# Patient Record
Sex: MALE | Race: WHITE | NOT HISPANIC OR LATINO
[De-identification: names, ages, dates, MRNs, and addresses within clinical notes are randomized per-mention and may not be internally consistent; named-entity substitution may affect disease eponyms.]

---
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
## 2021-05-27 NOTE — PROGRESS NOTES
[facility] Discharge Summary  Patient Name: Jhonny Judge  Date: 4/3/2019  Referral Diagnosis:   Shoulder pain [M25.519]  - Primary   Referring provider: Asad Asencio MD  Visit Diagnosis:   1. Impingement syndrome of right shoulder     2. Shoulder weakness     3. Poor posture          Goals: All Goals Met  Pt. will be independent with home exercise program in : 4 weeks  Pt. will report decreased intensity, frequency of : in 4 weeks;Comment  Comment:: 50%    Pt will: return to swimming without increased pain in 6 weeks:  Pt will: be able to reach overhead without increased pain in 4 weeks:      Patient was seen for 7 visits from 1/4/19 to 2/13/19 with - missed appointments.    The patient met goals and has demonstrated understanding of and independence in the home program for self-care, and progression to next steps.  He will initiate contact if questions or concerns arise.    Therapy will be discontinued at this time.  The patient will need a new referral to resume.    Thank you for your referral.  Chuy SMITH  4/3/2019  8:56 AM

## 2021-05-31 VITALS — HEIGHT: 73 IN | WEIGHT: 131 LBS | BODY MASS INDEX: 17.36 KG/M2

## 2021-05-31 VITALS — WEIGHT: 131 LBS | BODY MASS INDEX: 17.4 KG/M2

## 2021-06-14 NOTE — PROGRESS NOTES
"Assessment/ Plan     1. Sore throat/ RLQ abdominal pain  Patient's rapid strep is negative.  Backup culture will be sent and he will be notified if that is positive.  Likely viral process.  He was tender in the right lower quadrant so I ran a CBC which was normal.  Discussed with patient that if things get worse or spikes a fever, he should be reevaluated.  Would have him to stick to clear liquids tonight.  I did call and talk to his father on the phone as he came in by himself today.  I reviewed with him the assessment and plan today.  They will keep an eye on his abdominal pain.  - Rapid Strep A Screen-Throat  - Group A Strep, RNA Direct Detection, Throat  - HM2(CBC w/o Differential)      Subjective:       Jhonny Judge is a 17 y.o. male who presents for evaluation of sore throat.  Patient presents by himself today.  He states that he started to have a sore throat yesterday.  He is felt kind of achy in his stomach feels \"weird\".  His appetite has been down.  No specific exposures that he knows of.  He has felt hot and cold all day, but has not taken his temperature.  He said no vomiting or diarrhea.  One of his friends has been sick but not sure with what.  He is otherwise healthy without major medical problems.  He has had no abdominal surgeries.    Relevant past medical, family, surgical, and social history reviewed with patient, unless noted in HPI, not pertinent for this visit.    Review of Systems   A 12 point comprehensive review of systems was negative except as noted.      No current outpatient prescriptions on file.     No current facility-administered medications for this visit.        Objective:      /56  Pulse 92  Temp 98.2  F (36.8  C) (Oral)   Resp 18  Ht 6' 0.75\" (1.848 m)  Wt 131 lb (59.4 kg)  BMI 17.4 kg/m2      General appearance: alert, appears stated age and cooperative  Head: Normocephalic, without obvious abnormality, atraumatic  Eyes: conjunctivae/corneas clear. PERRL, EOM's " intact. Fundi benign.  Ears: normal TM's and external ear canals both ears  Nose: Nares normal. Septum midline. Mucosa normal. No drainage or sinus tenderness.  Throat: lips, mucosa, and tongue normal; teeth and gums normal, oropharynx is negative  Neck: no adenopathy  Lungs: clear to auscultation bilaterally  Heart: regular rate and rhythm, S1, S2 normal, no murmur, click, rub or gallop  Abdomen: soft,  bowel sounds normal; no masses,  no organomegaly, tender in the right lower quadrant      Recent Results (from the past 168 hour(s))   Rapid Strep A Screen-Throat   Result Value Ref Range    Rapid Strep A Antigen No Group A Strep detected, presumptive negative No Group A Strep detected, presumptive negative   HM2(CBC w/o Differential)   Result Value Ref Range    WBC 4.8 4.5 - 13.0 thou/uL    RBC 4.99 4.50 - 5.30 mill/uL    Hemoglobin 15.3 13.0 - 16.0 g/dL    Hematocrit 44.8 36.0 - 51.0 %    MCV 90 78 - 98 fL    MCH 30.6 25.0 - 35.0 pg    MCHC 34.1 32.0 - 36.0 g/dL    RDW 11.6 11.5 - 14.0 %    Platelets 208 140 - 440 thou/uL    MPV 8.5 7.0 - 10.0 fL          This note has been dictated using voice recognition software. Any grammatical or context distortions are unintentional and inherent to the software

## 2021-06-15 NOTE — PROGRESS NOTES
Name: Jhonny Judge  Age: 17 y.o.  Gender: male  : 2000  Date of Encounter: 2018      HPI:  Jhonny Judge is a 17 y.o.  male who presents to the [facility] by mom with concerns of sore throat.  Patient reports sore throat for the last 3 days and felt feverish today at school.  Did not go to school on Tuesday because earlier in the week he had nausea and emesis which has not recurred.  Last few days he has been eating and drinking normally.  He did not take his temperature today at school.  Denies headache, ear pain, wheezing, shortness of breath, nausea, vomiting, diarrhea and skin rash.  His sister comes home on the weekends and a couple weekends ago she was diagnosed with strep pharyngitis.  She was treated for strep but her sore throat persisted and this weekend she came back home and was treated diagnosed with mono.    Current Medication:   Medications reviewed and updated.  No current outpatient prescriptions on file.    Past Med / Surg History:  No past medical history on file.  No past surgical history on file.    Fam / Soc History:  Family History   Problem Relation Age of Onset     Depression Mother      Depression Father      Depression Maternal Grandmother      Social History     Social History     Marital status: Single     Spouse name: N/A     Number of children: N/A     Years of education: N/A     Occupational History     Not on file.     Social History Main Topics     Smoking status: Never Smoker     Smokeless tobacco: Never Used     Alcohol use Not on file     Drug use: Not on file     Sexual activity: Not on file     Other Topics Concern     Not on file     Social History Narrative       ROS:  14 point review of systems unremarkable except as mentioned in HPI    Objective:  Vitals: /64  Pulse 58  Temp 98.4  F (36.9  C) (Temporal)   Wt 131 lb (59.4 kg)  SpO2 98%    Gen: Alert, awake, well appearing  HEENT: NC, AT,   Ears:  Ear canals clear.  TMs normal  appearing.  Eyes:  EOMI.  Pupils equally round and reactive to light. Conjunctivae clear.  Sclera non-icteric.  Nose:  Nasal mucosa pink, septum midline.  No sinus tenderness  Mouth:  MMM. Oropharynx erythematous. No tonsillar exudate. Teeth, gum, tongue and lips clear.  Neck:  Supple, FROM, No lymphandenpathy, No TM  Heart: Regular rate and rhythm; normal S1 and S2; no murmurs, gallops, or rubs.  Peripheral Vessels: Normal pulses and perfusion.  Lungs: Unlabored respirations; symmetric chest expansion; clear breath sounds.  Extremities: No clubbing, cyanosis, or edema. Normal upper and lower extremities.  Skin: Normal turgor and without lesions or rashes.  Mental Status: Alert, oriented, in no distress. Mood and affect appropriate.l.     Pertinent results / imaging:  Results for orders placed or performed in visit on 01/12/18   Rapid Strep A Screen-Throat   Result Value Ref Range    Rapid Strep A Antigen No Group A Strep detected, presumptive negative No Group A Strep detected, presumptive negative       Assessment: Viral illness    Plan: Reviewed negative RST test with mom and patient.  Informed follow-up PCR strep test is pending and they will be notified of results only if positive.  If negative most probably viral illness.  Reviewed symptomatic cares, expected course, duration of symptoms and reasons to return for reevaluation.  Mom and patient voiced understanding and were in agreement with plan.      Magalis Last MD  1/12/2018

## 2021-06-17 NOTE — PATIENT INSTRUCTIONS - HE
Patient Instructions by Chuy Vallejo PT at 1/8/2019  4:00 PM     Author: Chuy Vallejo PT Service: -- Author Type: Physical Therapist    Filed: 1/8/2019  4:29 PM Encounter Date: 1/8/2019 Status: Signed    : Chuy Valleoj PT (Physical Therapist)        SERRATUS WALL SLIDE - ELASTIC BAND    Place an elastic band around your arms at the level of your wrists as shown. Next, place your forearms and hands along a wall so that your elbows are bent and your arms point towards the ceiling.     Then, protract (round) your shoulder blades forward and then slide your arms up the wall as shown.     Keep elbows tucked in.  Don't slide too high (FCI up)    Return to the original position and repeat.    5-10 repetitions  3 sets  1X/day

## 2021-06-17 NOTE — PATIENT INSTRUCTIONS - HE
Patient Instructions by Chuy Vallejo PT at 1/4/2019  7:30 AM     Author: Chuy Vallejo PT Service: -- Author Type: Physical Therapist    Filed: 1/4/2019  8:11 AM Encounter Date: 1/4/2019 Status: Signed    : Chuy Vallejo PT (Physical Therapist)             SIDELYING EXTERNAL ROTATION WITH TOWEL    Lie on your side with your elbow bent to 90 degrees. Place a rolled up towel between your arm and the side your body as shown.     Squeeze your shoulder blade back and down toward your buttocks and hold that position.     Next, roll your arm upwards from your stomach area towards the ceiling while maintaining your arm against the towel and with your shoulder blade held down and back the entire time. Lower your arm and repeat.     Hold 3 seconds.  10-20X  2 sets  1X/day      PRONE RETRACTION EXTENSION - PRONE I    Lying face down with your arms by your side, slowly move your arms upward towards the ceiling as you squeeze your shoulder blades downwards and towards your spine.    Hold 3-5 seconds  10-20X  2 sets  1X/day    PRONE T - BILATERAL - THUMBS UP    Lie face down with your elbow straight and arms out to the side. Next, set your scapula by retracting it towards your spine and downward towards your feet. Then, slowly raise your arms towards the ceiling keeping your elbow straight the entire time as shown.    Your thumbs should be pointed in the upward direction as your arm raises.    Hold 3-5 seconds  10-20X  2 sets  1X/day      ELASTIC BAND ROWS     Holding elastic band with both hands, draw back the band as you bend your elbows. Keep your elbows near the side of your body.    Hold 3-5 seconds  10-20X  2 sets  1X/day

## 2021-06-17 NOTE — PATIENT INSTRUCTIONS - HE
Patient Instructions by Chuy Vallejo PT at 1/11/2019  8:00 AM     Author: Chuy Vallejo PT Service: -- Author Type: Physical Therapist    Filed: 1/11/2019  8:27 AM Encounter Date: 1/11/2019 Status: Signed    : Chuy Vallejo, PT (Physical Therapist)        PRONE Y    Lying face down with your arms stretched out upwards as shown, slowly move your arms upward towards the ceiling as you squeeze your shoulder blades downward and towards your spine.    10-20X  1-2 sets  1X/day

## 2021-06-22 NOTE — PROGRESS NOTES
[facility]   Shoulder Initial Evaluation    Patient Name: Jhonny Judge  Date of evaluation: 1/4/2019  Referral Diagnosis: Shoulder pain [M25.519]  - Primary   Referring provider: Asad Asencio MD  Visit Diagnosis:     ICD-10-CM    1. Impingement syndrome of right shoulder M75.41    2. Shoulder weakness R29.898    3. Poor posture R29.3        Assessment:       Jhonny Judge is a 18 y.o. male who presents to therapy today with chief complaints of right shoulder pain which started in September, but has worsened in December 2018 after swimming season has begun. The patient is limited with swimming, lifting, reaching due to pain. With examination, the patient demonstrates s/s consistent with shoulder impingement due to hypermobility and weakness in RC and scapular stabilizers. The patient will likely benefit from skilled PT to improve his strength, mobility, pain, and function.     Pt. is appropriate for skilled PT intervention as outlined in the Plan of Care (POC).  Pt. is a good candidate for skilled PT services to improve pain levels and function.    POC and pathology of condition were reviewed with patient.  Pt. is in agreement with the Plan of Care  A Home Exercise Program (HEP) was initiated today.  Pt. was instructed in exercises by PT and patient was given a handout with detailed instructions.    Goals:  Pt. will be independent with home exercise program in : 4 weeks  Pt. will report decreased intensity, frequency of : in 4 weeks;Comment  Comment:: 50%    Pt will: return to swimming without increased pain in 6 weeks:  Pt will: be able to reach overhead without increased pain in 4 weeks:      Patient's expectations/goals are realistic.    Barriers to Learning or Achieving Goals:  No Barriers.       Plan / Patient Instructions:        Plan of Care:   Communication with: Referral Source  Patient Related Instruction: Nature of Condition;Posture;Precautions;Treatment plan and rationale;Next  steps;Expected outcome;Self Care instruction;Basis of treatment;Body mechanics  Times per Week: 1-2  Number of Weeks: 6  Number of Visits: 6  Discharge Planning: pt will meet all PT goals or reach a plateau with PT  Precautions / Restrictions : none  Therapeutic Exercise: ROM;Stretching;Strengthening  Neuromuscular Reeducation: kinesio tape;posture;TNE;core  Manual Therapy: soft tissue mobilization;myofascial release;joint mobilization;muscle energy  Modalities: TENS;electrical stimulation;other  Modalities: LIMITED PRN      Plan for next visit: Review HEP, trial serratus strengthening, banded ER/IR, pec stretch, KT to shoulder possibly  .   Subjective:       Social information:   Occupation:Student- /swimmer   Work Status:NA    History of Present Illness:      Pain Started September 2018, decreased, but returned in December 2018. Start of swim season in thanksgiving started getting more soreness in the right shoulder. Improved for a bit,but then return over jake break. Can keep him up at night now. Has been doing just kicking currently with swim practice. Had a cortisone injection on Wednesday this seek. Pain a little sharp with movement. Doing some icing.  Pain Described as comes in waves, dull and aching most of the time, at certain points can get sharper pains.  Worse with lifting, swimming, reaching up, opening doors, sleeping on his  Better with ibuprofen.  Previous Treatment injection, ibuprofen.  History of no other history.    Pain Rating:3  Pain rating at best: 2  Pain rating at worst: 8  Pain description: aching, dull, pain and sharp    Functional limitations are described as occurring with:          Objective:      Note: Items left blank indicates the item was not performed or not indicated at the time of the evaluation.    Patient Outcome Measures :    Shoulder Pain and Disability Index (SPADI) in %: 37     Scores range from 0-100%, where a score of 0% represents minimal pain and maximal  function. The minimal clincically important difference is a score reduction of 10%.    Shoulder Examination  1. Impingement syndrome of right shoulder     2. Shoulder weakness     3. Poor posture       Precautions/Restrictions: None  Involved side: Right  Posture Observation:       Patient demonstrates significant kyphotic posture at rest- able to have neutral spine.  Scapulae wing at rest.    Cervical Clearing: Normal    Shoulder/Elbow ROM    Date: 1/4/18     Shoulder and Elbow ROM ( )   AROM in degrees AROM in degrees AROM in degrees    Right Left Right Left Right Left   Shoulder Flexion (0-180 ) 140 deg 140 deg       Shoulder Abduction (0-180 ) 150 deg 170 deg       Shoulder Extension (0-60 )         Shoulder ER (0-90 ) 120 110       Shoulder IR (0-70 ) 90 (35 pure GH motion) 80       Shoulder IR/Ext T4  T7 T4  T3       Elbow Flexion (150 )         Elbow Extension (0 )          PROM in degrees PROM in degrees PROM in degrees    Right Left Right Left Right Left   Shoulder Flexion (0-180 )         Shoulder Abduction (0-180 )         Shoulder Extension (0-60 )         Shoulder ER (0-90 )         Shoulder IR (0-70 )         Elbow Flexion (150 )         Elbow Extension (0 )           Shoulder/Elbow Strength   Date: 1/4/18     Shoulder/Elbow Strength (/5)  Manual Muscle Test (MMT) MMT MMT MMT    Right Left Right Left Right Left   Shoulder Flexion 5- 5-       Supraspinatus 4 4       Shoulder Abduction 5- 5-       Shoulder Extension         Shoulder External Rotation 4 4       Shoulder Internal Rotation 5- 5-       Elbow Flexion 5 5       Elbow Extension 5 5       Other:         Other:           Palpation: Tenderness to biceps tendon, supraspinatus tendon.     Joint Assessment:  Sternoclavicular Joint Assessment: WNL  Acromioclavicular Joint Assessment: WNL  Scapulothoracic Joint Assessment: Winging at rest and with return from scaption  Glenohumeral Joint Assessment:WNL.    Shoulder Special Tests     Impingement Cluster  Right (+/-) Left (+/-) Rotator Cuff Tests Right (+/-) Left (+/-)   Arellano-Joel +  Drop Arm Sign -    Painful Arc +  Hornblowers     Infraspinatus Test +  ERLS     Neers Test +  IRLS     AC Tests Right (+/-) Left (+/-)      Active Compression   Labral Tests Right (+/-) Left (+/-)   Crossbody Adduction   Biceps Load Test II -    AC Resisted Extension   Jerk Test     GH Instability Tests Right (+/-) Left (+/-) Alysha Test     Sulcus Sign -  Biceps Tests Right (+/-) Left (+/-)   Apprehension -  Speed -    Relocation -  Albert wilder     Other:   Other:     Other:   Other:         Treatment Today    TREATMENT MINUTES COMMENTS   Evaluation 25 Low complexity   Self-care/ Home management     Manual therapy     Neuromuscular Re-education     Therapeutic Activity     Therapeutic Exercises 15 Education and trial of HEP, handout given.   Gait training     Modality__________________                Total 40    Blank areas are intentional and mean the treatment did not include these items.     PT Evaluation Code: (Please list factors)  Patient History/Comorbidities: None  Examination: see objective  Clinical Presentation: stable  Clinical Decision Making: low    Patient History/  Comorbidities Examination  (body structures and functions, activity limitations, and/or participation restrictions) Clinical Presentation Clinical Decision Making (Complexity)   No documented Comorbidities or personal factors 1-2 Elements Stable and/or uncomplicated Low   1-2 documented comorbidities or personal factor 3 Elements Evolving clinical presentation with changing characteristics Moderate   3-4 documented comorbidities or personal factors 4 or more Unstable and unpredictable High            Chuy SMITH  1/4/2019  6:45 AM

## 2021-06-22 NOTE — PROGRESS NOTES
[facility] Daily Progress     Patient Name: Jhonny Judge  Date: 1/8/2019  Visit #: 2  PTA visit #:  -  Referral Diagnosis:   Shoulder pain [M25.519]  - Primary   Referring provider: Asad Asencio MD  Visit Diagnosis:     ICD-10-CM    1. Impingement syndrome of right shoulder M75.41    2. Shoulder weakness R29.898    3. Poor posture R29.3      Jhonny Judge is a 18 y.o. male who presents to therapy today with chief complaints of right shoulder pain which started in September, but has worsened in December 2018 after swimming season has begun. The patient is limited with swimming, lifting, reaching due to pain. With examination, the patient demonstrates s/s consistent with shoulder impingement due to hypermobility and weakness in RC and scapular stabilizers. The patient will likely benefit from skilled PT to improve his strength, mobility, pain, and function.     Assessment:     HEP/POC compliance is  good .     Patient reporting good tolerance to HEP and is showing improvements in mobility today. We were able to progress his strengthening exercises, but he does fatigue quickly with these and will be important to improve his strength prior to returning to swimming.    Goal Status:  Pt. will be independent with home exercise program in : 4 weeks  Pt. will report decreased intensity, frequency of : in 4 weeks;Comment  Comment:: 50%    Pt will: return to swimming without increased pain in 6 weeks:  Pt will: be able to reach overhead without increased pain in 4 weeks:      Plan / Patient Education:     Continue with initial plan of care.     Plan for next visit: Review HEP, trial serratus strengthening, banded ER/IR,  KT to shoulder possibly, wall ball rolls, physioball rollouts. Pushups on medicine ball, physioball stir the pot. Green band for extensions.    Subjective:     Pain Rating: Mild    Right is feeling pretty good, but left is a little worse. Slightly worse on the left. No questions that he can  think    Objective:     Fatigues quickly with serratus strengthening.   No pain with exercises.  Addition of serratus wall slide to HEP.    Exercises:  Exercise #1: SL shoulder ER 2 X 15 1# weight  Comment #1: Rows Blue X 10 extensions X 10 green  Exercise #2: I's and T's X 15   Comment #2: Push up + on and off of step  Exercise #3: Serratus wall slides yellow x 10   Comment #3: TRX rows X 10   Exercise #4: Foam roll Pec stretch, flexion and snow angels.        Treatment Today     TREATMENT MINUTES COMMENTS   Evaluation     Self-care/ Home management     Manual therapy     Neuromuscular Re-education     Therapeutic Activity     Therapeutic Exercises 26 Review and progression of HEP   Gait training     Modality__________________                Total 26    Blank areas are intentional and mean the treatment did not include these items.       Chuy SMITH  1/8/2019

## 2021-06-23 NOTE — PROGRESS NOTES
[facility] Daily Progress     Patient Name: Jhonny Judge  Date: 1/11/2019  Visit #: 4  PTA visit #:  -  Referral Diagnosis:   Shoulder pain [M25.519]  - Primary   Referring provider: Asad Asencio MD  Visit Diagnosis:     ICD-10-CM    1. Impingement syndrome of right shoulder M75.41    2. Shoulder weakness R29.898    3. Poor posture R29.3      Jhonny Judge is a 18 y.o. male who presents to therapy today with chief complaints of right shoulder pain which started in September, but has worsened in December 2018 after swimming season has begun. The patient is limited with swimming, lifting, reaching due to pain. With examination, the patient demonstrates s/s consistent with shoulder impingement due to hypermobility and weakness in RC and scapular stabilizers. The patient will likely benefit from skilled PT to improve his strength, mobility, pain, and function.     Assessment:     HEP/POC compliance is  good .     Patient reporting good tolerance to HEP and is showing improvements in mobility today. He also reporting less pain complaints. We were able to progress his strengthening exercises, but he does fatigue quickly with these and will be important to improve his strength prior to returning to swimming. If painfree next week, will likely start gradual return to swimming to assess tolerance.    Goal Status:  Pt. will be independent with home exercise program in : 4 weeks  Pt. will report decreased intensity, frequency of : in 4 weeks;Comment  Comment:: 50%    Pt will: return to swimming without increased pain in 6 weeks:  Pt will: be able to reach overhead without increased pain in 4 weeks:      Plan / Patient Education:     Continue with initial plan of care.     Plan for next visit: Review HEP, trial serratus strengthening, banded ER/IR,  KT to shoulder possibly, wall ball rolls, physioball rollouts. Pushups on medicine ball, physioball stir the pot. If pain free Monday, discuss return to 50%  total swim distance at 75% intensity.    Subjective:     Pain Rating: Mild    No pain this AM. Wasn't able to get to the exercises yesterday due to a swim meet that went late.    Objective:     Fatigues quickly with serratus strengthening.   No pain with exercises.  Progressed RC strengthening to functional 90/90 position.    Exercises:  Exercise #1: SL shoulder ER 2 X 15 1# weight  Comment #1: Rows Blue X 10 extensions X 10 green  Exercise #2: I's and T's X 15   Comment #2: Push up + on and off of step  Exercise #3: Serratus wall slides yellow x 10   Comment #3: TRX rows X 10   Exercise #4: Foam roll Pec stretch, flexion and snow angels.        Treatment Today     TREATMENT MINUTES COMMENTS   Evaluation     Self-care/ Home management     Manual therapy     Neuromuscular Re-education     Therapeutic Activity     Therapeutic Exercises 30 Review and progression of HEP   Gait training     Modality__________________                Total 30    Blank areas are intentional and mean the treatment did not include these items.       Chuy SMITH  1/11/2019

## 2021-06-23 NOTE — PROGRESS NOTES
[facility] Daily Progress     Patient Name: Jhonny Judge  Date: 1/23/2019  Visit #: 5  PTA visit #:  -  Referral Diagnosis:   Shoulder pain [M25.519]  - Primary   Referring provider: Asad Asencio MD  Visit Diagnosis:     ICD-10-CM    1. Impingement syndrome of right shoulder M75.41    2. Shoulder weakness R29.898    3. Poor posture R29.3      Jhonny Judge is a 18 y.o. male who presents to therapy today with chief complaints of right shoulder pain which started in September, but has worsened in December 2018 after swimming season has begun. The patient is limited with swimming, lifting, reaching due to pain. With examination, the patient demonstrates s/s consistent with shoulder impingement due to hypermobility and weakness in RC and scapular stabilizers. The patient will likely benefit from skilled PT to improve his strength, mobility, pain, and function.     Assessment:     HEP/POC compliance is  good .     Patient reporting good tolerance to HEP and is showing improvements in mobility today. He also reporting less pain complaints, with the exception of a slight flare swimming at a meet. The patient did not ice after and was educated again on the importance of this. The patient also shows improvements in his scapular control. We have been able to progress his strengthening exercises, but he does fatigue quickly with these and will be important to improve his strength prior to returning to full quantity of swimming. We did trial some Kinesiotape today to see if this helps with form and neural awareness with swimming.    Goal Status:  Pt. will be independent with home exercise program in : 4 weeks  Pt. will report decreased intensity, frequency of : in 4 weeks;Comment  Comment:: 50%    Pt will: return to swimming without increased pain in 6 weeks:  Pt will: be able to reach overhead without increased pain in 4 weeks:      Plan / Patient Education:     Continue with initial plan of care.     Plan  for next visit: Review HEP, trial serratus strengthening, banded ER/IR,  KT to shoulder possibly, wall ball rolls, physioball rollouts. Pushups on medicine ball, physioball stir the pot. If patient doing well with return to swimming possible DC.    Subjective:     Pain Rating: Feel okay.    Had a meet on Saturday and swam 3 50's, had some pain with this. Not as bad as initially. He did not do any icing. Was able to swim 50%, 75%, 90% in practice throughout the week without increased pain.    Objective:     Fatigues quickly with serratus strengthening.   Educated on importance of icing after swimming.  Painfree shoulder ROM in session today.  Slight winging with descent from scaption- improved since initial visit.    Exercises:l  Exercise #1: SL shoulder ER 2 X 15 1# weight  Comment #1: Rows Blue X 10 extensions X 10 green  Exercise #2: I's and T's X 15   Comment #2: Push up + on and off of step and bosu  Exercise #3: Serratus wall slides yellow x 10   Comment #3: TRX rows X 10   Exercise #4: Foam roll Pec stretch, flexion and snow angels.  Comment #4: Shoulder extensions X 10   Exercise #5: shoulder ER X 10 orange  Comment #5: rotary torso 90 30# started to R  Exercise #6: Body blade ER/IR and 90/90 ER/IR  Comment #6: Ball roll on wall  Exercise #7: reformer 9090 pulldowns 2R 1G X 10   Comment #7: reformer rows and extension X 10 2R       Treatment Today     TREATMENT MINUTES COMMENTS   Evaluation     Self-care/ Home management     Manual therapy     Neuromuscular Re-education 10 KT I strips 25-50% stretch to supraspinatus and infraspinatus. Educated on safety, purpose, and removal.   Therapeutic Activity     Therapeutic Exercises 20 Review and progression of HEP   Gait training     Modality__________________                Total 30    Blank areas are intentional and mean the treatment did not include these items.       Chuy SMITH  1/23/2019

## 2021-06-23 NOTE — PROGRESS NOTES
[facility] Daily Progress     Patient Name: Jhonny Judge  Date: 2/1/2019  Visit #: 6  PTA visit #:  -  Referral Diagnosis:   Shoulder pain [M25.519]  - Primary   Referring provider: Asad Asencio MD  Visit Diagnosis:     ICD-10-CM    1. Impingement syndrome of right shoulder M75.41    2. Shoulder weakness R29.898    3. Poor posture R29.3      Jhonny Judge is a 18 y.o. male who presents to therapy today with chief complaints of right shoulder pain which started in September, but has worsened in December 2018 after swimming season has begun. The patient is limited with swimming, lifting, reaching due to pain. With examination, the patient demonstrates s/s consistent with shoulder impingement due to hypermobility and weakness in RC and scapular stabilizers. The patient will likely benefit from skilled PT to improve his strength, mobility, pain, and function.     Assessment:     HEP/POC compliance is  good .     Patient reporting good tolerance to HEP and is showing improvements in mobility today. He also reporting less pain complaints, with the exception of a slight flare swimming at a meet. The patient did not ice after and was educated again on the importance of this. The patient also shows improvements in his scapular control. We have been able to progress his strengthening exercises, but he does fatigue quickly with these and will be important to improve his strength prior to returning to full quantity of swimming. We did trial some Kinesiotape again today due to feeling beneficial last session.    Goal Status:  Pt. will be independent with home exercise program in : 4 weeks  Pt. will report decreased intensity, frequency of : in 4 weeks;Comment  Comment:: 50%    Pt will: return to swimming without increased pain in 6 weeks:  Pt will: be able to reach overhead without increased pain in 4 weeks:      Plan / Patient Education:     Continue with initial plan of care.     Plan for next visit: Review  HEP, trial serratus strengthening, banded ER/IR,  KT to shoulder possibly, wall ball rolls, physioball rollouts. Pushups on medicine ball, physioball stir the pot.     Subjective:     Pain Rating: Feel okay right now.    Swam Thursday and Saturday last week. Both days he had pain with swimming. Hasn't had practice until yesterday and just kicked. Pain is behind the shoulder blade, almost in a new spot. Takes a few days to calm down. Mostly the front crawl that gives him pain. He did do some icing.     Objective:     Subscap, rhomboid tenderness.   Tenderness to biceps tendon on right.    Discussed taking further time off of swimming to allow the shoulder to rest.    Exercises:  Exercise #1: SL shoulder ER 2 X 15 1# weight  Comment #1: Rows Blue X 10 extensions X 10 green  Exercise #2: I's and T's X 15   Comment #2: Push up + on and off of step and bosu  Exercise #3: Serratus wall slides yellow x 10   Comment #3: TRX rows X 10   Exercise #4: Foam roll Pec stretch, flexion and snow angels.  Comment #4: Shoulder extensions X 10   Exercise #5: shoulder ER X 10 orange  Comment #5: rotary torso 90 30# started to R  Exercise #6: Body blade ER/IR and 90/90 ER/IR  Comment #6: Ball roll on wall  Exercise #7: reformer 9090 pulldowns 2R 1G X 10   Comment #7: reformer rows and extension X 10 2R       Treatment Today     TREATMENT MINUTES COMMENTS   Evaluation     Self-care/ Home management     Manual therapy     Neuromuscular Re-education 12 KT I strips 25-50% stretch to supraspinatus and infraspinatus. Educated on safety, purpose, and removal.  TPR to subscap and rhomboid. Cross friction massage to right biceps tendon   Therapeutic Activity     Therapeutic Exercises 20 Review and progression of HEP   Gait training     Modality__________________                Total 32    Blank areas are intentional and mean the treatment did not include these items.       Chuy SMITH  2/1/2019

## 2021-06-23 NOTE — PROGRESS NOTES
[facility] Daily Progress     Patient Name: Jhonny Judge  Date: 1/14/2019  Visit #: 4  PTA visit #:  -  Referral Diagnosis:   Shoulder pain [M25.519]  - Primary   Referring provider: Asad Asencio MD  Visit Diagnosis:     ICD-10-CM    1. Impingement syndrome of right shoulder M75.41    2. Shoulder weakness R29.898    3. Poor posture R29.3      Jhonny Judge is a 18 y.o. male who presents to therapy today with chief complaints of right shoulder pain which started in September, but has worsened in December 2018 after swimming season has begun. The patient is limited with swimming, lifting, reaching due to pain. With examination, the patient demonstrates s/s consistent with shoulder impingement due to hypermobility and weakness in RC and scapular stabilizers. The patient will likely benefit from skilled PT to improve his strength, mobility, pain, and function.     Assessment:     HEP/POC compliance is  good .     Patient reporting good tolerance to HEP and is showing improvements in mobility today. He also reporting less pain complaints. The patient also shows improvements in his scapular control. We were able to progress his strengthening exercises, but he does fatigue quickly with these and will be important to improve his strength prior to returning to swimming. If painfree with return to light swimming this week, will progress intensity and distance.    Goal Status:  Pt. will be independent with home exercise program in : 4 weeks  Pt. will report decreased intensity, frequency of : in 4 weeks;Comment  Comment:: 50%    Pt will: return to swimming without increased pain in 6 weeks:  Pt will: be able to reach overhead without increased pain in 4 weeks:      Plan / Patient Education:     Continue with initial plan of care.     Plan for next visit: Review HEP, trial serratus strengthening, banded ER/IR,  KT to shoulder possibly, wall ball rolls, physioball rollouts. Pushups on medicine ball,  physioball stir the pot. If pain free Friday after return to swimming, discuss return to 50-75% total swim distance at 100% intensity.    Subjective:     Pain Rating: Feel okay.    Felt good after last time. Bagged groceries on Saturday and low back was sore with with. Friday did some light demonstration and felt fine with this.    Objective:     Fatigues quickly with serratus strengthening.   Mild pain with body blade in 90/90 position.  Progressed RC strengthening to functional 90/90 position.  Improved form and control with exercises today- less cueing to scapular control.    Exercises:l  Exercise #1: SL shoulder ER 2 X 15 1# weight  Comment #1: Rows Blue X 10 extensions X 10 green  Exercise #2: I's and T's X 15   Comment #2: Push up + on and off of step and bosu  Exercise #3: Serratus wall slides yellow x 10   Comment #3: TRX rows X 10   Exercise #4: Foam roll Pec stretch, flexion and snow angels.  Comment #4: Shoulder extensions X 10   Exercise #5: shoulder ER X 10 orange  Comment #5: rotary torso 90 30# started to R  Exercise #6: Body blade ER/IR and 90/90 ER/IR  Comment #6: Ball roll on wall  Exercise #7: reformer 9090 pulldowns 2R 1G X 10   Comment #7: reformer rows and extension X 10 2R       Treatment Today     TREATMENT MINUTES COMMENTS   Evaluation     Self-care/ Home management     Manual therapy     Neuromuscular Re-education     Therapeutic Activity     Therapeutic Exercises 30 Review and progression of HEP   Gait training     Modality__________________                Total 30    Blank areas are intentional and mean the treatment did not include these items.       Chuy SMITH  1/14/2019

## 2021-06-24 NOTE — PROGRESS NOTES
[facility] Daily Progress     Patient Name: Jhonny Judge  Date: 2/13/2019  Visit #: 7  PTA visit #:  -  Referral Diagnosis:   Shoulder pain [M25.519]  - Primary   Referring provider: Asad Asencio MD  Visit Diagnosis:     ICD-10-CM    1. Impingement syndrome of right shoulder M75.41    2. Shoulder weakness R29.898    3. Poor posture R29.3      Jhonny Judge is a 18 y.o. male who presents to therapy today with chief complaints of right shoulder pain which started in September, but has worsened in December 2018 after swimming season has begun. The patient is limited with swimming, lifting, reaching due to pain. With examination, the patient demonstrates s/s consistent with shoulder impingement due to hypermobility and weakness in RC and scapular stabilizers. The patient will likely benefit from skilled PT to improve his strength, mobility, pain, and function.     Assessment:     HEP/POC compliance is  good .     Patient reporting good tolerance to HEP and is showing improvements in mobility today. He also reporting less pain complaints, with the exception of a slight flare swimming at a meet. He does report he has been doing well at practice. The patient also shows improvements in his scapular control. He is starting to show some improvements in endurance and strength with exercises.     Goal Status:  Pt. will be independent with home exercise program in : 4 weeks  Pt. will report decreased intensity, frequency of : in 4 weeks;Comment  Comment:: 50%    Pt will: return to swimming without increased pain in 6 weeks:  Pt will: be able to reach overhead without increased pain in 4 weeks:      Plan / Patient Education:     Continue with initial plan of care.     Plan for next visit: Review HEP, trial serratus strengthening, banded ER/IR,  KT to shoulder possibly, wall ball rolls, physioball rollouts. Pushups on medicine ball, physioball stir the pot.     Subjective:     Pain Rating: Feel okay right  now.    Swam Thursday and Saturday last week. Both days he had pain with swimming. Hasn't had practice until yesterday and just kicked. Pain is behind the shoulder blade, almost in a new spot. Takes a few days to calm down. Mostly the front crawl that gives him pain. He did do some icing.     Started out taper so things are getting easier. Has been still going half/half.    Objective:     Subscap, rhomboid tenderness.   Tenderness to biceps tendon on right.    Discussed taking further time off of swimming to allow the shoulder to rest.    Exercises:  Exercise #1: SL shoulder ER 2 X 15 1# weight  Comment #1: Rows Blue X 10 extensions X 10 green  Exercise #2: I's and T's X 15   Comment #2: Push up + on and off of step and bosu  Exercise #3: Serratus wall slides yellow x 10   Comment #3: TRX rows X 10   Exercise #4: Foam roll Pec stretch, flexion and snow angels.  Comment #4: Shoulder extensions X 10   Exercise #5: shoulder ER X 10 orange  Comment #5: rotary torso 90 30# started to R  Exercise #6: Body blade ER/IR and 90/90 ER/IR  Comment #6: Ball roll on wall  Exercise #7: reformer 9090 pulldowns 2R 1G X 10   Comment #7: reformer rows and extension X 10 2R       Treatment Today     TREATMENT MINUTES COMMENTS   Evaluation     Self-care/ Home management     Manual therapy     Neuromuscular Re-education Not done today KT I strips 25-50% stretch to supraspinatus and infraspinatus. Educated on safety, purpose, and removal.  TPR to subscap and rhomboid. Cross friction massage to right biceps tendon   Therapeutic Activity     Therapeutic Exercises 26 Review and progression of HEP   Gait training     Modality__________________                Total 26    Blank areas are intentional and mean the treatment did not include these items.       Chuy SMITH  2/13/2019